# Patient Record
Sex: FEMALE | Race: WHITE | NOT HISPANIC OR LATINO | Employment: OTHER | URBAN - METROPOLITAN AREA
[De-identification: names, ages, dates, MRNs, and addresses within clinical notes are randomized per-mention and may not be internally consistent; named-entity substitution may affect disease eponyms.]

---

## 2021-04-08 DIAGNOSIS — Z23 ENCOUNTER FOR IMMUNIZATION: ICD-10-CM

## 2021-07-30 ENCOUNTER — VBI (OUTPATIENT)
Dept: ADMINISTRATIVE | Facility: OTHER | Age: 71
End: 2021-07-30

## 2021-07-30 NOTE — TELEPHONE ENCOUNTER
Upon review of the In Basket request we were able to locate, review, and update the patient chart as requested for CRC: Colonoscopy, DEXA Scan, Immunization(s) Inflenza, Mammogram and Pap Smear (HPV) aka Cervical Cancer Screening  Any additional questions or concerns should be emailed to the Practice Liaisons via [email or web address] email, please do not reply via In Basket      Thank you  Gaurav Begum

## 2021-11-05 ENCOUNTER — ANNUAL EXAM (OUTPATIENT)
Dept: OBGYN CLINIC | Facility: CLINIC | Age: 71
End: 2021-11-05
Payer: MEDICARE

## 2021-11-05 VITALS
HEIGHT: 63 IN | DIASTOLIC BLOOD PRESSURE: 80 MMHG | SYSTOLIC BLOOD PRESSURE: 120 MMHG | BODY MASS INDEX: 31.71 KG/M2 | WEIGHT: 179 LBS

## 2021-11-05 DIAGNOSIS — E28.39 ESTROGEN DEFICIENCY: ICD-10-CM

## 2021-11-05 DIAGNOSIS — Z13.820 SCREENING FOR OSTEOPOROSIS: ICD-10-CM

## 2021-11-05 DIAGNOSIS — Z01.419 ENCOUNTER FOR GYNECOLOGICAL EXAMINATION WITHOUT ABNORMAL FINDING: Primary | ICD-10-CM

## 2021-11-05 DIAGNOSIS — Z12.31 ENCOUNTER FOR SCREENING MAMMOGRAM FOR MALIGNANT NEOPLASM OF BREAST: ICD-10-CM

## 2021-11-05 PROCEDURE — G0101 CA SCREEN;PELVIC/BREAST EXAM: HCPCS | Performed by: OBSTETRICS & GYNECOLOGY

## 2022-12-02 ENCOUNTER — OFFICE VISIT (OUTPATIENT)
Dept: OBGYN CLINIC | Facility: CLINIC | Age: 72
End: 2022-12-02

## 2022-12-02 VITALS
BODY MASS INDEX: 33.05 KG/M2 | WEIGHT: 179.6 LBS | DIASTOLIC BLOOD PRESSURE: 72 MMHG | SYSTOLIC BLOOD PRESSURE: 132 MMHG | HEIGHT: 62 IN

## 2022-12-02 DIAGNOSIS — E28.39 ESTROGEN DEFICIENCY: ICD-10-CM

## 2022-12-02 DIAGNOSIS — Z12.31 ENCOUNTER FOR SCREENING MAMMOGRAM FOR MALIGNANT NEOPLASM OF BREAST: Primary | ICD-10-CM

## 2022-12-02 DIAGNOSIS — Z78.0 POSTMENOPAUSAL: ICD-10-CM

## 2022-12-02 NOTE — PROGRESS NOTES
[address], [address], [facility], [address]    ASSESSMENT/PLAN: Noris Echols is a 73 y o  [id number] who presents for annual gynecologic exam       - gyn  Fu  completed today  - Cervical Cancer Screening: Current ASCCP Guidelines reviewed  Last Pap: 2021   Next Pap Due: na  - COVID vaccine  [place]    Booster  2021   None   Since due to  Heart palpitations   + flu shot   - Breast Cancer Screening: Last Mammogram 2021,   - Colorectal cancer screening was not ordered  up todate    Per pt  2017 w  10 years   - The following were reviewed in today's visit: mammography screening ordered, menopause, osteoporosis, adequate intake of calcium and vitamin D, exercise, healthy diet, DEXA ordered and colonoscopy discussed     Additional problems addressed during this visit:  1  Encounter for screening mammogram for malignant neoplasm of breast  -     Mammo screening bilateral w 3d & cad; Future    2  Postmenopausal        CC:  Gyn  Fu     HPI: Noris Echols is a 73 y o  [id number] who presents for annual gynecologic examination  77 yo here   No bleeding, bloating or satiety  The following portions of the patient's history were reviewed and updated as appropriate: She  has a past medical history of Breast cancer screening (2021), H/O bone density study (2021), Lyme disease, Obesity due to excess calories, Papanicolaou smear (), and Varicella ()  She  has a past surgical history that includes  section; Tubal ligation (); Mammo (historical) (2021); DXA procedure(historical) (2021); and Colonoscopy (2017)  Her family history includes Brain cancer in her family; Diabetes in her mother; Heart attack in her mother; Osteoarthritis in her mother; Throat cancer in her father  She  reports that she has never smoked  She has never used smokeless tobacco  She reports current alcohol use   She reports that she does not use drugs  No current outpatient medications on file  No current facility-administered medications for this visit  She is allergic to sulfamethoxazole-trimethoprim       Review of Systems:  All systems normal except as noted in HPI          Objective:  /72 (BP Location: Left arm, Patient Position: Sitting, Cuff Size: Standard)   Ht 5' 1 5" (1 562 m)   Wt 81 5 kg (179 lb 9 6 oz)   BMI 33 39 kg/m²    Physical Exam  Vitals and nursing note reviewed  Constitutional:       Appearance: Normal appearance  HENT:      Head: Normocephalic  Cardiovascular:      Rate and Rhythm: Normal rate and regular rhythm  Pulses: Normal pulses  Heart sounds: Normal heart sounds  Pulmonary:      Effort: Pulmonary effort is normal       Breath sounds: Normal breath sounds  Chest:      Chest wall: No mass, lacerations, swelling, tenderness or edema  Breasts: Dragan Score is 4  Breasts are symmetrical       Right: Normal  No swelling, bleeding, inverted nipple, mass, nipple discharge, skin change or tenderness  Left: No swelling, bleeding, inverted nipple, mass, nipple discharge, skin change or tenderness  Abdominal:      General: Abdomen is flat  Bowel sounds are normal       Palpations: Abdomen is soft  Genitourinary:     General: Normal vulva  Exam position: Lithotomy position  Pubic Area: No rash  Dragan stage (genital): 4       Labia:         Right: No rash, tenderness or lesion  Left: No rash, tenderness or lesion  Urethra: No urethral pain, urethral swelling or urethral lesion  Vagina: Normal       Cervix: No cervical motion tenderness or discharge  Uterus: Normal        Adnexa: Right adnexa normal and left adnexa normal       Rectum: Normal    Musculoskeletal:         General: Normal range of motion  Cervical back: Neck supple  Lymphadenopathy:      Upper Body:      Right upper body: No supraclavicular, axillary or pectoral adenopathy  Left upper body: No supraclavicular, axillary or pectoral adenopathy  Lower Body: No right inguinal adenopathy  No left inguinal adenopathy  Skin:     General: Skin is warm and dry  Neurological:      General: No focal deficit present  Mental Status: She is alert and oriented to person, place, and time     Psychiatric:         Mood and Affect: Mood normal          Behavior: Behavior normal

## 2022-12-02 NOTE — PATIENT INSTRUCTIONS
Calcium 1200-1500mg + 800-1000 IU Vit D daily unless otherwise directed  Avoid falls  Exercise 150 minutes per week minimum including weight bearing exercises  DEXA scan-         No further paps after age 72 as long as there has been adequate normal paps completed, no history of NELSON 2  or a more severe pap diagnosis in the last 20 years  Annual mammogram and monthly breast self exam recommended   Colonoscopy-           Haley Tenorio 20 times twice daily  Silicone based lubricant with sex  Vaginal moisturizers twice weekly as needed

## 2023-11-03 ENCOUNTER — TELEPHONE (OUTPATIENT)
Dept: OBGYN CLINIC | Facility: HOSPITAL | Age: 73
End: 2023-11-03

## 2023-11-03 ENCOUNTER — TELEPHONE (OUTPATIENT)
Age: 73
End: 2023-11-03

## 2023-11-03 NOTE — TELEPHONE ENCOUNTER
Caller: Patient     Doctor/Office: Podiatry    Call regarding :  Tying to see if Xray results should be faxed or if she can bring in with her at time of appointment.      Call was transferred to: Podiatry

## 2023-11-03 NOTE — TELEPHONE ENCOUNTER
Hello,  Please advise if the following patient can be forced onto the schedule:    Patient: Tim Gonzalez    : 1950    MRN: [id number]    Call back #: [phone]    Insurance: Medicare    Reason for appointment: 2mm piece of glass/ceramic in foot/ [place]/nothing available soon. Requested doctor/location: Dr. Christianne Britt or Dr. Jozef Crews in [facility] office      Thank you.

## 2023-11-06 ENCOUNTER — APPOINTMENT (OUTPATIENT)
Dept: RADIOLOGY | Facility: CLINIC | Age: 73
End: 2023-11-06
Payer: MEDICARE

## 2023-11-06 ENCOUNTER — OFFICE VISIT (OUTPATIENT)
Dept: PODIATRY | Facility: CLINIC | Age: 73
End: 2023-11-06
Payer: MEDICARE

## 2023-11-06 VITALS
HEART RATE: 91 BPM | WEIGHT: 181 LBS | SYSTOLIC BLOOD PRESSURE: 165 MMHG | DIASTOLIC BLOOD PRESSURE: 82 MMHG | BODY MASS INDEX: 32.07 KG/M2 | HEIGHT: 63 IN

## 2023-11-06 DIAGNOSIS — M79.5 RESIDUAL FOREIGN BODY IN SOFT TISSUE: Primary | ICD-10-CM

## 2023-11-06 DIAGNOSIS — M79.672 LEFT FOOT PAIN: ICD-10-CM

## 2023-11-06 DIAGNOSIS — M79.5 RESIDUAL FOREIGN BODY IN SOFT TISSUE: ICD-10-CM

## 2023-11-06 PROCEDURE — 99203 OFFICE O/P NEW LOW 30 MIN: CPT | Performed by: PODIATRIST

## 2023-11-06 PROCEDURE — 73630 X-RAY EXAM OF FOOT: CPT

## 2023-11-06 NOTE — PROGRESS NOTES
PATIENT:  Stanley Hammer  1950       ASSESSMENT:     1. Residual foreign body in soft tissue  XR foot 3+ vw left      2. Left foot pain                  PLAN:  1. Reviewed medical records. Patient was counseled and educated on the condition and the diagnosis. 2. X-ray was obtained and personally reviewed. The radiological findings were discussed with the patient. 3. The diagnosis, treatment options and prognosis were discussed with the patient. 4. She has residual foreign body (Glass) in left heel. Her skin is closed and port of entry is not visible. Discussed options and she would proceed with surgical removal in the OR. 5. Explained surgical details and post-op course. Discussed all risks and complications related to the patient's condition and surgery. The benefits of surgery were also discussed. The patient understood that the surgery would not guarantee desirable outcome. All questions and concerns were addressed and the consent was signed. 6. Will plan OR. Imaging: I have personally reviewed pertinent films in PACS  Labs, pathology, and Other Studies: I have personally reviewed pertinent reports. Subjective:       HPI  The patient presents with chief complaint of left foot pain. She stepped on broken glasses 2 weeks ago. She got it out at home. Then, another piece came out a week ago. She went to ED and X-ray was stilling showing foreign body. Her skin is healed. No bleeding at this time. She has tenderness when she walks. No swelling or redness. No associated numbness or paresthesia. No significant weakness or dysfunction. The following portions of the patient's history were reviewed and updated as appropriate: allergies, current medications, past family history, past medical history, past social history, past surgical history and problem list.  All pertinent labs and images were reviewed.       Past Medical History  Past Medical History:   Diagnosis Date    Breast cancer screening 2021    birads  1 25-50% d    H/O bone density study 2021    normal    Lyme disease     Obesity due to excess calories     Papanicolaou smear     Varicella        Past Surgical History  Past Surgical History:   Procedure Laterality Date     SECTION      x3  , , 1985     COLONOSCOPY  2017    [facility] [address] [place] [address]     DXA PROCEDURE (HISTORICAL)  2021    [address] [facility] (HISTORICAL)  2021    [facility]        Allergies:  Sulfamethoxazole-trimethoprim    Medications:  No current outpatient medications on file. No current facility-administered medications for this visit. Social History:  Social History     Socioeconomic History    Marital status: /Civil Union     Spouse name: None    Number of children: None    Years of education: None    Highest education level: None   Occupational History    Occupation: Retired    Tobacco Use    Smoking status: Never    Smokeless tobacco: Never   Vaping Use    Vaping Use: Never used   Substance and Sexual Activity    Alcohol use: Yes     Comment: Special occasions (1 or less/month)     Drug use: Never     Comment: No - As per [employer]     Sexual activity: Yes     Partners: Male     Birth control/protection: Post-menopausal     Comment: No new partner in last year    Other Topics Concern    None   Social History Narrative    Exercise: 2-3 times a week    Domestic violence: No     Social Determinants of Health     Financial Resource Strain: Not on file   Food Insecurity: Not on file   Transportation Needs: Not on file   Physical Activity: Not on file   Stress: Not on file   Social Connections: Not on file   Intimate Partner Violence: Not on file   Housing Stability: Not on file          Review of Systems   Constitutional:  Negative for chills and fever.    Respiratory:  Negative for cough and shortness of breath. Cardiovascular:  Negative for chest pain. Gastrointestinal:  Negative for nausea and vomiting. Musculoskeletal:  Negative for gait problem. Allergic/Immunologic: Negative for immunocompromised state. Neurological:  Negative for weakness and numbness. Hematological: Negative. Psychiatric/Behavioral:  Negative for behavioral problems and confusion. Objective:      /82   Pulse 91   Ht 5' 3" (1.6 m)   Wt 82.1 kg (181 lb)   BMI 32.06 kg/m²          Physical Exam  Vitals reviewed. Constitutional:       General: She is not in acute distress. Appearance: She is not toxic-appearing or diaphoretic. HENT:      Head: Normocephalic and atraumatic. Eyes:      Extraocular Movements: Extraocular movements intact. Cardiovascular:      Rate and Rhythm: Normal rate and regular rhythm. Pulses: Normal pulses. Dorsalis pedis pulses are 2+ on the right side and 2+ on the left side. Posterior tibial pulses are 2+ on the right side and 2+ on the left side. Pulmonary:      Effort: Pulmonary effort is normal. No respiratory distress. Musculoskeletal:         General: No swelling or signs of injury. Cervical back: Normal range of motion and neck supple. Right lower leg: No edema. Left lower leg: No edema. Right foot: No foot drop. Left foot: No foot drop. Skin:     General: Skin is warm. Capillary Refill: Capillary refill takes less than 2 seconds. Coloration: Skin is not cyanotic or mottled. Findings: No abscess. Nails: There is no clubbing. Comments: No port of entry left heel. Generalized tenderness left plantar medial heel. No signs of infection. No drainage or bleeding. Neurological:      General: No focal deficit present. Mental Status: She is alert and oriented to person, place, and time. Cranial Nerves: No cranial nerve deficit. Sensory: No sensory deficit.       Motor: No weakness. Coordination: Coordination normal.   Psychiatric:         Mood and Affect: Mood normal.         Behavior: Behavior normal.         Thought Content:  Thought content normal.         Judgment: Judgment normal.

## 2023-11-07 ENCOUNTER — TELEPHONE (OUTPATIENT)
Dept: OBGYN CLINIC | Facility: CLINIC | Age: 73
End: 2023-11-07

## 2023-11-07 NOTE — TELEPHONE ENCOUNTER
Called and left message for patient regarding scheduling foot surgery (per Dr. Deepika Blanco). Mentioned that 11/17/23 is available.   Provided my direct ph [phone] for call back

## 2023-11-08 ENCOUNTER — TELEPHONE (OUTPATIENT)
Dept: OBGYN CLINIC | Facility: CLINIC | Age: 73
End: 2023-11-08

## 2023-11-08 ENCOUNTER — PREP FOR PROCEDURE (OUTPATIENT)
Dept: OBGYN CLINIC | Facility: CLINIC | Age: 73
End: 2023-11-08

## 2023-11-08 DIAGNOSIS — S91.342A PUNCTURE WOUND WITH FOREIGN BODY, LEFT FOOT, INITIAL ENCOUNTER: ICD-10-CM

## 2023-11-08 DIAGNOSIS — Z01.818 PRE-OPERATIVE CLEARANCE: Primary | ICD-10-CM

## 2023-11-08 RX ORDER — CHLORHEXIDINE GLUCONATE 4 G/100ML
SOLUTION TOPICAL DAILY PRN
Status: CANCELLED | OUTPATIENT
Start: 2023-11-17

## 2023-11-08 RX ORDER — CEFAZOLIN SODIUM 2 G/50ML
2000 SOLUTION INTRAVENOUS ONCE
Status: CANCELLED | OUTPATIENT
Start: 2023-11-17

## 2023-11-08 RX ORDER — CHLORHEXIDINE GLUCONATE ORAL RINSE 1.2 MG/ML
15 SOLUTION DENTAL ONCE
Status: CANCELLED | OUTPATIENT
Start: 2023-11-17

## 2023-11-08 NOTE — TELEPHONE ENCOUNTER
Spoke with patient. Scheduled foot surgery for 11/17/23, and all related appointments. All questions answered.

## 2023-11-09 ENCOUNTER — APPOINTMENT (OUTPATIENT)
Dept: LAB | Facility: HOSPITAL | Age: 73
End: 2023-11-09
Payer: MEDICARE

## 2023-11-09 ENCOUNTER — OFFICE VISIT (OUTPATIENT)
Dept: LAB | Facility: HOSPITAL | Age: 73
End: 2023-11-09
Payer: MEDICARE

## 2023-11-09 DIAGNOSIS — Z01.818 PRE-OPERATIVE CLEARANCE: ICD-10-CM

## 2023-11-09 LAB
ANION GAP SERPL CALCULATED.3IONS-SCNC: 11 MMOL/L
ATRIAL RATE: 74 BPM
BASOPHILS # BLD AUTO: 0.01 THOUSANDS/ÂΜL (ref 0–0.1)
BASOPHILS NFR BLD AUTO: 0 % (ref 0–1)
BUN SERPL-MCNC: 15 MG/DL (ref 5–25)
CALCIUM SERPL-MCNC: 9.3 MG/DL (ref 8.4–10.2)
CHLORIDE SERPL-SCNC: 103 MMOL/L (ref 96–108)
CO2 SERPL-SCNC: 26 MMOL/L (ref 21–32)
CREAT SERPL-MCNC: 0.82 MG/DL (ref 0.6–1.3)
EOSINOPHIL # BLD AUTO: 0.1 THOUSAND/ÂΜL (ref 0–0.61)
EOSINOPHIL NFR BLD AUTO: 2 % (ref 0–6)
ERYTHROCYTE [DISTWIDTH] IN BLOOD BY AUTOMATED COUNT: 12.7 % (ref 11.6–15.1)
GFR SERPL CREATININE-BSD FRML MDRD: 72 ML/MIN/1.73SQ M
GLUCOSE P FAST SERPL-MCNC: 111 MG/DL (ref 65–99)
HCT VFR BLD AUTO: 43.4 % (ref 34.8–46.1)
HGB BLD-MCNC: 15 G/DL (ref 11.5–15.4)
IMM GRANULOCYTES # BLD AUTO: 0.02 THOUSAND/UL (ref 0–0.2)
IMM GRANULOCYTES NFR BLD AUTO: 0 % (ref 0–2)
LYMPHOCYTES # BLD AUTO: 1.98 THOUSANDS/ÂΜL (ref 0.6–4.47)
LYMPHOCYTES NFR BLD AUTO: 36 % (ref 14–44)
MCH RBC QN AUTO: 31.9 PG (ref 26.8–34.3)
MCHC RBC AUTO-ENTMCNC: 34.6 G/DL (ref 31.4–37.4)
MCV RBC AUTO: 92 FL (ref 82–98)
MONOCYTES # BLD AUTO: 0.46 THOUSAND/ÂΜL (ref 0.17–1.22)
MONOCYTES NFR BLD AUTO: 8 % (ref 4–12)
NEUTROPHILS # BLD AUTO: 2.96 THOUSANDS/ÂΜL (ref 1.85–7.62)
NEUTS SEG NFR BLD AUTO: 54 % (ref 43–75)
NRBC BLD AUTO-RTO: 0 /100 WBCS
P AXIS: 37 DEGREES
PLATELET # BLD AUTO: 273 THOUSANDS/UL (ref 149–390)
PMV BLD AUTO: 10.1 FL (ref 8.9–12.7)
POTASSIUM SERPL-SCNC: 3.9 MMOL/L (ref 3.5–5.3)
PR INTERVAL: 190 MS
QRS AXIS: 3 DEGREES
QRSD INTERVAL: 72 MS
QT INTERVAL: 372 MS
QTC INTERVAL: 412 MS
RBC # BLD AUTO: 4.7 MILLION/UL (ref 3.81–5.12)
SODIUM SERPL-SCNC: 140 MMOL/L (ref 135–147)
T WAVE AXIS: 31 DEGREES
VENTRICULAR RATE: 74 BPM
WBC # BLD AUTO: 5.53 THOUSAND/UL (ref 4.31–10.16)

## 2023-11-09 PROCEDURE — 36415 COLL VENOUS BLD VENIPUNCTURE: CPT

## 2023-11-09 PROCEDURE — 93005 ELECTROCARDIOGRAM TRACING: CPT

## 2023-11-09 PROCEDURE — 93010 ELECTROCARDIOGRAM REPORT: CPT | Performed by: INTERNAL MEDICINE

## 2023-11-09 PROCEDURE — 85025 COMPLETE CBC W/AUTO DIFF WBC: CPT

## 2023-11-09 PROCEDURE — 80048 BASIC METABOLIC PNL TOTAL CA: CPT

## 2023-11-14 NOTE — PRE-PROCEDURE INSTRUCTIONS
No outpatient medications have been marked as taking for [facility]). Pt not taking any medications    Medication instructions for day surgery reviewed. Please use only a sip of water to take your instructed medications. Avoid all over the counter vitamins, supplements and NSAIDS for one week prior to surgery per anesthesia guidelines. Tylenol is ok to take as needed. You will receive a call one business day prior to surgery with an arrival time and hospital directions. If your surgery is scheduled on a Monday, the hospital will be calling you on the Friday prior to your surgery. If you have not heard from anyone by 8pm, please call the hospital supervisor through the hospital  at [phone]. Nan Ann [phone]). Do not eat or drink anything after midnight the night before your surgery, including candy, mints, lifesavers, or chewing gum. Do not drink alcohol 24hrs before your surgery. Try not to smoke at least 24hrs before your surgery. Follow the pre surgery showering instructions as listed in the [facility] Surgical Experience Booklet” or otherwise provided by your surgeon's office. Do not use a blade to shave the surgical area 1 week before surgery. It is okay to use a clean electric clippers up to 24 hours before surgery. Do not apply any lotions, creams, including makeup, cologne, deodorant, or perfumes after showering on the day of your surgery. Do not use dry shampoo, hair spray, hair gel, or any type of hair products. No contact lenses, eye make-up, or artificial eyelashes. Remove nail polish, including gel polish, and any artificial, gel, or acrylic nails if possible. Remove all jewelry including rings and body piercing jewelry. Wear causal clothing that is easy to take on and off. Consider your type of surgery. Keep any valuables, jewelry, piercings at home. Please bring any specially ordered equipment (sling, braces) if indicated.     Arrange for a responsible person to drive you to and from the hospital on the day of your surgery. Visitor Guidelines discussed. Call the surgeon's office with any new illnesses, exposures, or additional questions prior to surgery. Please reference your [facility] Surgical Experience Booklet” for additional information to prepare for your upcoming surgery.

## 2023-11-16 ENCOUNTER — ANESTHESIA EVENT (OUTPATIENT)
Dept: PERIOP | Facility: HOSPITAL | Age: 73
End: 2023-11-16
Payer: MEDICARE

## 2023-11-17 ENCOUNTER — ANESTHESIA (OUTPATIENT)
Dept: PERIOP | Facility: HOSPITAL | Age: 73
End: 2023-11-17
Payer: MEDICARE

## 2023-11-17 ENCOUNTER — APPOINTMENT (OUTPATIENT)
Dept: RADIOLOGY | Facility: HOSPITAL | Age: 73
End: 2023-11-17
Payer: MEDICARE

## 2023-11-17 ENCOUNTER — HOSPITAL ENCOUNTER (OUTPATIENT)
Facility: HOSPITAL | Age: 73
Setting detail: OUTPATIENT SURGERY
Discharge: HOME | End: 2023-11-17
Attending: PODIATRIST | Admitting: PODIATRIST
Payer: MEDICARE

## 2023-11-17 VITALS
HEART RATE: 66 BPM | DIASTOLIC BLOOD PRESSURE: 80 MMHG | RESPIRATION RATE: 18 BRPM | TEMPERATURE: 97.8 F | SYSTOLIC BLOOD PRESSURE: 170 MMHG | OXYGEN SATURATION: 97 %

## 2023-11-17 DIAGNOSIS — Z98.890 POST-OPERATIVE STATE: Primary | ICD-10-CM

## 2023-11-17 PROCEDURE — 73650 X-RAY EXAM OF HEEL: CPT

## 2023-11-17 PROCEDURE — 73620 X-RAY EXAM OF FOOT: CPT

## 2023-11-17 PROCEDURE — 99024 POSTOP FOLLOW-UP VISIT: CPT | Performed by: PODIATRIST

## 2023-11-17 PROCEDURE — 28192 REMOVAL OF FOOT FOREIGN BODY: CPT | Performed by: PODIATRIST

## 2023-11-17 RX ORDER — SODIUM CHLORIDE, SODIUM LACTATE, POTASSIUM CHLORIDE, CALCIUM CHLORIDE 600; 310; 30; 20 MG/100ML; MG/100ML; MG/100ML; MG/100ML
75 INJECTION, SOLUTION INTRAVENOUS CONTINUOUS
Status: DISCONTINUED | OUTPATIENT
Start: 2023-11-17 | End: 2023-11-17 | Stop reason: HOSPADM

## 2023-11-17 RX ORDER — CEFAZOLIN SODIUM 2 G/50ML
2000 SOLUTION INTRAVENOUS ONCE
Status: COMPLETED | OUTPATIENT
Start: 2023-11-17 | End: 2023-11-17

## 2023-11-17 RX ORDER — CHLORHEXIDINE GLUCONATE 4 G/100ML
SOLUTION TOPICAL DAILY PRN
Status: DISCONTINUED | OUTPATIENT
Start: 2023-11-17 | End: 2023-11-17 | Stop reason: HOSPADM

## 2023-11-17 RX ORDER — ACETAMINOPHEN 325 MG/1
650 TABLET ORAL EVERY 4 HOURS PRN
Status: DISCONTINUED | OUTPATIENT
Start: 2023-11-17 | End: 2023-11-17 | Stop reason: HOSPADM

## 2023-11-17 RX ORDER — FENTANYL CITRATE/PF 50 MCG/ML
25 SYRINGE (ML) INJECTION
Status: DISCONTINUED | OUTPATIENT
Start: 2023-11-17 | End: 2023-11-17 | Stop reason: HOSPADM

## 2023-11-17 RX ORDER — OXYCODONE HYDROCHLORIDE AND ACETAMINOPHEN 5; 325 MG/1; MG/1
1 TABLET ORAL EVERY 4 HOURS PRN
Status: DISCONTINUED | OUTPATIENT
Start: 2023-11-17 | End: 2023-11-17 | Stop reason: HOSPADM

## 2023-11-17 RX ORDER — PROPOFOL 10 MG/ML
INJECTION, EMULSION INTRAVENOUS AS NEEDED
Status: DISCONTINUED | OUTPATIENT
Start: 2023-11-17 | End: 2023-11-17

## 2023-11-17 RX ORDER — CHLORHEXIDINE GLUCONATE ORAL RINSE 1.2 MG/ML
15 SOLUTION DENTAL ONCE
Status: COMPLETED | OUTPATIENT
Start: 2023-11-17 | End: 2023-11-17

## 2023-11-17 RX ORDER — LIDOCAINE HYDROCHLORIDE 10 MG/ML
INJECTION, SOLUTION EPIDURAL; INFILTRATION; INTRACAUDAL; PERINEURAL AS NEEDED
Status: DISCONTINUED | OUTPATIENT
Start: 2023-11-17 | End: 2023-11-17

## 2023-11-17 RX ORDER — FENTANYL CITRATE 50 UG/ML
INJECTION, SOLUTION INTRAMUSCULAR; INTRAVENOUS AS NEEDED
Status: DISCONTINUED | OUTPATIENT
Start: 2023-11-17 | End: 2023-11-17

## 2023-11-17 RX ORDER — OXYCODONE HYDROCHLORIDE AND ACETAMINOPHEN 5; 325 MG/1; MG/1
1 TABLET ORAL EVERY 4 HOURS PRN
Qty: 10 TABLET | Refills: 0 | Status: SHIPPED | OUTPATIENT
Start: 2023-11-17 | End: 2023-11-27

## 2023-11-17 RX ORDER — PROPOFOL 10 MG/ML
INJECTION, EMULSION INTRAVENOUS CONTINUOUS PRN
Status: DISCONTINUED | OUTPATIENT
Start: 2023-11-17 | End: 2023-11-17

## 2023-11-17 RX ORDER — MAGNESIUM HYDROXIDE 1200 MG/15ML
LIQUID ORAL AS NEEDED
Status: DISCONTINUED | OUTPATIENT
Start: 2023-11-17 | End: 2023-11-17 | Stop reason: HOSPADM

## 2023-11-17 RX ORDER — LIDOCAINE HYDROCHLORIDE 10 MG/ML
INJECTION, SOLUTION EPIDURAL; INFILTRATION; INTRACAUDAL; PERINEURAL AS NEEDED
Status: DISCONTINUED | OUTPATIENT
Start: 2023-11-17 | End: 2023-11-17 | Stop reason: HOSPADM

## 2023-11-17 RX ORDER — BUPIVACAINE HYDROCHLORIDE 5 MG/ML
INJECTION, SOLUTION EPIDURAL; INTRACAUDAL AS NEEDED
Status: DISCONTINUED | OUTPATIENT
Start: 2023-11-17 | End: 2023-11-17 | Stop reason: HOSPADM

## 2023-11-17 RX ADMIN — PROPOFOL 50 MG: 10 INJECTION, EMULSION INTRAVENOUS at 08:46

## 2023-11-17 RX ADMIN — PROPOFOL 100 MCG/KG/MIN: 10 INJECTION, EMULSION INTRAVENOUS at 08:48

## 2023-11-17 RX ADMIN — PROPOFOL 50 MG: 10 INJECTION, EMULSION INTRAVENOUS at 08:50

## 2023-11-17 RX ADMIN — FENTANYL CITRATE 50 MCG: 50 INJECTION, SOLUTION INTRAMUSCULAR; INTRAVENOUS at 08:46

## 2023-11-17 RX ADMIN — CEFAZOLIN SODIUM 2000 MG: 2 SOLUTION INTRAVENOUS at 08:51

## 2023-11-17 RX ADMIN — PROPOFOL 50 MG: 10 INJECTION, EMULSION INTRAVENOUS at 09:08

## 2023-11-17 RX ADMIN — LIDOCAINE HYDROCHLORIDE 50 MG: 10 INJECTION, SOLUTION EPIDURAL; INFILTRATION; INTRACAUDAL at 08:46

## 2023-11-17 RX ADMIN — CHLORHEXIDINE GLUCONATE 0.12% ORAL RINSE 15 ML: 1.2 LIQUID ORAL at 07:22

## 2023-11-17 RX ADMIN — SODIUM CHLORIDE, SODIUM LACTATE, POTASSIUM CHLORIDE, AND CALCIUM CHLORIDE: .6; .31; .03; .02 INJECTION, SOLUTION INTRAVENOUS at 08:30

## 2023-11-17 RX ADMIN — FENTANYL CITRATE 50 MCG: 50 INJECTION, SOLUTION INTRAMUSCULAR; INTRAVENOUS at 08:50

## 2023-11-17 NOTE — NURSING NOTE
Awake and tolerating po well.  Surgical shoe dispensed and discharge instructions reviewed with patient and  who is here to drive home/

## 2023-11-17 NOTE — OP NOTE
OPERATIVE REPORT - Podiatry  PATIENT NAME: Chris Ridley    :  1950  MRN: [id number]  Pt Location: [address] [place] [address] ROOM 12    SURGERY DATE: 2023    Surgeon(s) and Role:     * Jay Lowe, DPM - Primary     * PRABHU MendezM - Assisting    Pre-op Diagnosis:  Puncture wound with foreign body, left foot, initial encounter S91.342A    Post-Op Diagnosis Codes:     * Puncture wound with foreign body, left foot, initial encounter [S91.342A]    Procedure(s) (LRB):  REMOVAL FOREIGN BODY LEFT FOOT (Left)    Specimen(s):  * No specimens in log *    Estimated Blood Loss:   Minimal    Drains:  * No LDAs found *    Anesthesia Type:   Choice with 10 ml of 1% Lidocaine and 0.5% Bupivacaine in a 1:1 mixture    Hemostasis:  Pneumatic ankle tourniquet set at 250 mmHg for 47 mins  Direct compression, electrocautery    Materials:  3-0 nylon    Injectables:  None    Operative Findings:  No obvious foreign body identified, no foreign body seen on intra-op fluoroscopy     Complications:   None    Procedure and Technique:     Under mild sedation, the patient was brought into the operating room and placed on the operating room table in the supine position. IV sedation was achieved by anesthesia team and a universal timeout was performed where all parties are in agreement of correct patient, correct procedure and correct site. A pneumatic tourniquet was then placed over the patient's left lower extremity with ample padding. A V block was performed consisting of 10 ml of local anesthetic. The foot was then prepped and draped in the usual aseptic manner. An esmarch bandage was used to exsangunate the foot and the pneumatic tourniquet was then inflated to 250 mmHg. Attention was directed to medial plantar heel. A 2cm incision was made at calloused skin area at site of suspected entrance of foreign body. Position confirmed under c-arm. No radiopaque foreign body visualized under C arm. Dissection through subcutaneous layer was made. An 18-gauge needle was used to probe her foreign body. No obvious foreign body seen. The surgical incision was irrigated with copious amounts of normal sterile saline. Skin edges were reapproximated and closure was obtained utilizing 3-0 nylon. The foot was then cleansed and dried. A postoperative injection consisting of 8 ml of 0.5% Bupivacaine was performed. The incision site was dressed with betadine soaked adaptic, gauze. This was then covered with a Kerlix and an ACE wrap. The tourniquet was deflated at approximately 47 min and normal hyperemic response was noted to all digits. The patient tolerated the procedure and anesthesia well without immediate complications and transferred to PACU with vital signs stable. Dr. Ria Lawrence was present during the entire procedure and participated in all key aspects. SIGNATURE: Mary Stokes DPM  DATE: November 17, 2023  TIME: 10:03 AM      Portions of the record may have been created with voice recognition software. Occasional wrong word or "sound a like" substitutions may have occurred due to the inherent limitations of voice recognition software. Read the chart carefully and recognize, using context, where substitutions have occurred.

## 2023-11-17 NOTE — DISCHARGE INSTR - AVS FIRST PAGE
Dr. Dandy Shields Instructions    1. Take your prescribed medication as directed. 2. Upon arrival at home, lie down and elevate your surgical foot on 2 pillows. 3. Stay off your feet as much as possible for the first 24-48 hours. This is when your feet first swell and may become painful. After 48 hours you may begin limited walking following these restrictions: Toe touch weightbearing to surgical foot, surgical shoe as needed     4. Drink large quantities of water. Consume no alcohol. Continue a well-balanced diet. 5. Report any unusual discomfort or fever to this office. 6. A limited amount of discomfort and swelling is to be expected. In some cases the skin may take on a bruised appearance. The surgical cleansing solution that was applied to your foot prior to the operation is dark in color and the operation site may appear to be oozing when it actually is not. 7. A slight amount of blood is to be expected, and is no cause for alarm. Do not remove the dressings. If there is active bleeding and if the bleeding persists, add additional gauze to the bandage, apply direct pressure, elevate your feet and call this office. 8. Do not get the dressings wet. As regular bathing may be inconvenient, sponge baths are recommended. 9. When anesthesia wears off and if any discomfort should be present, apply an ice pack directly over the operated area for 15 minute intervals for several hours or until the pain leaves. (USE IN EXCESS OF 15 MINUTES COULD CAUSE FROSTBITE). Do not use hot water bags or electric pads. A convenient icepack can be made by placing ice cubes in a plastic bag and covering this with a towel. 10. Take over-the-counter laxative for constipation, this is common with use of narcotic medications.

## 2023-11-17 NOTE — ANESTHESIA PROCEDURE NOTES
Anesthesia Notable Event    Date/Time: 11/17/2023 10:47 AM    Performed by: Francy Farris MD  Authorized by: Francy Farris MD

## 2023-11-17 NOTE — INTERVAL H&P NOTE
H&P reviewed. After examining the patient I find no changes in the patients condition since the H&P had been written.     Vitals:    11/17/23 0717   BP: 153/68   Pulse: 86   Resp: 16   Temp: 97.9 °F (36.6 °C)   SpO2: 95%

## 2023-11-17 NOTE — ANESTHESIA PREPROCEDURE EVALUATION
Procedure:  REMOVAL FOREIGN BODY LEFT FOOT (Left: Foot)    Relevant Problems   Other   (+) BMI 33.0-33.9,adult   (+) Postmenopausal        Physical Exam    Airway    Mallampati score: II  TM Distance: >3 FB  Neck ROM: full     Dental   No notable dental hx     Cardiovascular      Pulmonary      Other Findings  post-pubertal.      Anesthesia Plan  ASA Score- 2     Anesthesia Type- IV sedation with anesthesia with ASA Monitors. Additional Monitors:     Airway Plan:            Plan Factors-Exercise tolerance (METS): >4 METS. Chart reviewed. Patient summary reviewed. Patient is not a current smoker. Patient did not smoke on day of surgery. Induction- intravenous. Postoperative Plan- Plan for postoperative opioid use. Informed Consent- Anesthetic plan and risks discussed with patient. I personally reviewed this patient with the CRNA. Discussed and agreed on the Anesthesia Plan with the CRNA. Kenia Alonso

## 2023-11-17 NOTE — ANESTHESIA POSTPROCEDURE EVALUATION
Post-Op Assessment Note    CV Status:  Stable  Pain Score: 0    Pain management: adequate       Mental Status:  Alert and awake   Hydration Status:  Euvolemic   PONV Controlled:  Controlled   Airway Patency:  Patent     Post Op Vitals Reviewed: Yes    No anethesia notable event occurred.     Staff: ALEKSANDR           /72 (11/17/23 0954)    Temp (!) 97.2 °F (36.2 °C) (11/17/23 0954)    Pulse 73 (11/17/23 0954)   Resp 20 (11/17/23 0954)    SpO2 91 % (11/17/23 0954)

## 2023-11-17 NOTE — DISCHARGE SUMMARY
Discharge Summary Outpatient Procedure Podiatry -   Phu Dire 81 y.o. female MRN: [id number]  Unit/Bed#: OR POOL Encounter: [id number]    Admission Date: 11/17/2023     Admitting Diagnosis: Puncture wound with foreign body, left foot, initial encounter Mayra Styles    Discharge Diagnosis: same    Procedures Performed: REMOVAL FOREIGN BODY LEFT FOOT: 48053 (CPT®)    Complications: none    Condition at Discharge: stable    Discharge instructions/Information to patient and family:   See after visit summary for information provided to patient and family. Provisions for Follow-Up Care/Important appointments:  See after visit summary for information related to follow-up care and any pertinent home health orders. Discharge Medications:  See after visit summary for reconciled discharge medications provided to patient and family.

## 2023-11-27 ENCOUNTER — OFFICE VISIT (OUTPATIENT)
Dept: PODIATRY | Facility: CLINIC | Age: 73
End: 2023-11-27

## 2023-11-27 VITALS
BODY MASS INDEX: 32.06 KG/M2 | HEIGHT: 63 IN | DIASTOLIC BLOOD PRESSURE: 82 MMHG | SYSTOLIC BLOOD PRESSURE: 154 MMHG | HEART RATE: 81 BPM

## 2023-11-27 DIAGNOSIS — M79.5 RESIDUAL FOREIGN BODY IN SOFT TISSUE: Primary | ICD-10-CM

## 2023-11-27 PROCEDURE — 99024 POSTOP FOLLOW-UP VISIT: CPT | Performed by: PODIATRIST

## 2023-11-27 NOTE — PROGRESS NOTES
PATIENT:  González Ma      1950    ASSESSMENT     1. Residual foreign body in soft tissue               PLAN  Patient is doing well post-operatively. Sutures left intact. Incision was cleaned with betadine and DSD applied to be kept C/D/I. Continue post-op care as instructed. Stressed on patient compliance about proper off-loading, staying off of feet, and proper dressing care. Call if any increase in pain, fevers, calf pain, shortness of breath, or general distress is noted. Patient instructed to go to ER if call is not returned immediately. RA in 1 week. HISTORY OF PRESENT ILLNESS  Patient presents for post-op appointment. Post-op pain is under control and resolving well. The patient is feeling well and in good spirits. Patient reported no post-op concern. REVIEW OF SYSTEMS  GENERAL: No fever or chills. HEART: No chest pain, or palpitation  RESPIRATORY:  No SOB or cough  GI: No Nausea, vomit or diarrhea  NEUROLOGIC: No syncope or acute weakness  MUSCULOSKELETAL: No calf pain or edema. PHYSICAL EXAMINATION    /82 Comment: unabel to take  Pulse 81   Ht 5' 3" (1.6 m)   BMI 32.06 kg/m²     GENERAL  The patient appears in NAD / non-toxic. Afebrile. VSS    VASCULAR EXAM  Pedal pulses and vascular status are intact. No calf pain or edema bilaterally. No cyanosis. DERMATOLOGIC EXAM  Incision is coapted and healing well. No signs of infection. No active drainage. Minimal post-op edema and ecchymosis. No necrosis or dehiscence. NEUROLOGIC EXAM  AAO X 3. No focal neurologic deficit. Neurologic status is intact BLE. MUSCULOSKELETAL EXAM  Normal post-op findings. ROM intact. No fluctuation or crepitus.

## 2023-12-04 ENCOUNTER — OFFICE VISIT (OUTPATIENT)
Dept: PODIATRY | Facility: CLINIC | Age: 73
End: 2023-12-04

## 2023-12-04 VITALS
SYSTOLIC BLOOD PRESSURE: 154 MMHG | DIASTOLIC BLOOD PRESSURE: 75 MMHG | BODY MASS INDEX: 32.07 KG/M2 | WEIGHT: 181 LBS | HEIGHT: 63 IN | HEART RATE: 87 BPM

## 2023-12-04 DIAGNOSIS — M79.5 RESIDUAL FOREIGN BODY IN SOFT TISSUE: Primary | ICD-10-CM

## 2023-12-04 PROCEDURE — 99024 POSTOP FOLLOW-UP VISIT: CPT | Performed by: PODIATRIST

## 2023-12-04 NOTE — PROGRESS NOTES
PATIENT:  Stanley Hammer      1950    ASSESSMENT     1. Residual foreign body in soft tissue               PLAN  Patient is doing well post-operatively. Sutures removed. Instructed skin care and protection. Slowly advance shoes as tolerated. Continue post-op care as instructed. Stressed on patient compliance about proper off-loading, staying off of feet, and proper dressing care. Call if any increase in pain, fevers, calf pain, shortness of breath, or general distress is noted. Patient instructed to go to ER if call is not returned immediately. RA in 3 weeks. HISTORY OF PRESENT ILLNESS  Patient presents for post-op appointment. Post-op pain is minimal.  The patient is feeling well and in good spirits. REVIEW OF SYSTEMS  GENERAL: No fever or chills. HEART: No chest pain, or palpitation  RESPIRATORY:  No SOB or cough  GI: No Nausea, vomit or diarrhea  NEUROLOGIC: No syncope or acute weakness  MUSCULOSKELETAL: No calf pain or edema. PHYSICAL EXAMINATION    /75   Pulse 87   Ht 5' 3" (1.6 m)   Wt 82.1 kg (181 lb)   BMI 32.06 kg/m²     GENERAL  The patient appears in NAD / non-toxic. Afebrile. VSS    VASCULAR EXAM  Pedal pulses and vascular status are intact. No calf pain or edema bilaterally. No cyanosis. DERMATOLOGIC EXAM  Incision is coapted and healed. No signs of infection. No drainage. Minimal post-op edema and ecchymosis. No necrosis or dehiscence. NEUROLOGIC EXAM  AAO X 3. No focal neurologic deficit. Neurologic status is intact BLE. MUSCULOSKELETAL EXAM  Normal post-op findings. ROM intact. No fluctuation or crepitus.

## 2023-12-27 ENCOUNTER — OFFICE VISIT (OUTPATIENT)
Dept: PODIATRY | Facility: CLINIC | Age: 73
End: 2023-12-27

## 2023-12-27 VITALS
SYSTOLIC BLOOD PRESSURE: 142 MMHG | WEIGHT: 181 LBS | HEIGHT: 63 IN | DIASTOLIC BLOOD PRESSURE: 84 MMHG | HEART RATE: 91 BPM | BODY MASS INDEX: 32.07 KG/M2

## 2023-12-27 DIAGNOSIS — M79.5 RESIDUAL FOREIGN BODY IN SOFT TISSUE: Primary | ICD-10-CM

## 2023-12-27 PROCEDURE — 99024 POSTOP FOLLOW-UP VISIT: CPT | Performed by: PODIATRIST

## 2023-12-27 NOTE — PROGRESS NOTES
"        PATIENT:  Julia Valdez      1950    ASSESSMENT     1. Residual foreign body in soft tissue               PLAN  Patient is doing well post-operatively.  Instructed skin care.  Protective dressing until sensitivity resolves.  Slowly advance activity as tolerated.  D/C her from our care.      HISTORY OF PRESENT ILLNESS  Patient presents for post-op appointment.  Pain is minimal.  Some sensitivity / burning sensation around the incision after being on her feet for a while.  She was on her feet quite a bit during the Holiday weekend.      REVIEW OF SYSTEMS  GENERAL: No fever or chills.    HEART: No chest pain, or palpitation  RESPIRATORY:  No SOB or cough  GI: No Nausea, vomit or diarrhea  NEUROLOGIC: No syncope or acute weakness      PHYSICAL EXAMINATION    /84   Pulse 91   Ht 5' 3\" (1.6 m)   Wt 82.1 kg (181 lb)   BMI 32.06 kg/m²     GENERAL  The patient appears in NAD / non-toxic. Afebrile. VSS    VASCULAR EXAM  Pedal pulses and vascular status are intact.  No calf pain / edema bilaterally.  No cyanosis.    DERMATOLOGIC EXAM  Incision remains healed.  No signs of infection. No drainage.  No necrosis or dehiscence.    NEUROLOGIC EXAM  AAO X 3.  No focal neurologic deficit.  Neurologic status is intact BLE.    MUSCULOSKELETAL EXAM  Normal post-op findings. ROM intact.  No fluctuation or crepitus.  "

## 2024-01-08 ENCOUNTER — ANNUAL EXAM (OUTPATIENT)
Dept: OBGYN CLINIC | Facility: CLINIC | Age: 74
End: 2024-01-08
Payer: MEDICARE

## 2024-01-08 VITALS
BODY MASS INDEX: 32.07 KG/M2 | WEIGHT: 181 LBS | SYSTOLIC BLOOD PRESSURE: 130 MMHG | HEIGHT: 63 IN | DIASTOLIC BLOOD PRESSURE: 74 MMHG

## 2024-01-08 DIAGNOSIS — Z78.0 POSTMENOPAUSAL: ICD-10-CM

## 2024-01-08 DIAGNOSIS — E28.39 ESTROGEN DEFICIENCY: ICD-10-CM

## 2024-01-08 DIAGNOSIS — Z12.31 ENCOUNTER FOR SCREENING MAMMOGRAM FOR MALIGNANT NEOPLASM OF BREAST: ICD-10-CM

## 2024-01-08 DIAGNOSIS — Z13.820 SCREENING FOR OSTEOPOROSIS: ICD-10-CM

## 2024-01-08 DIAGNOSIS — Z01.419 ENCOUNTER FOR GYNECOLOGICAL EXAMINATION WITHOUT ABNORMAL FINDING: Primary | ICD-10-CM

## 2024-01-08 PROCEDURE — G0101 CA SCREEN;PELVIC/BREAST EXAM: HCPCS | Performed by: OBSTETRICS & GYNECOLOGY

## 2024-01-08 NOTE — PROGRESS NOTES
[facility] - [address], [address], [place] [address]    ASSESSMENT/PLAN: Julia Valdez is a 73 y.o.  who presents for annual gynecologic exam.    Encounter for routine gynecologic examination  - Routine well woman exam completed today.  - Cervical Cancer Screening: Current ASCCP Guidelines reviewed. Last Pap: 2021 . Next Pap Due: none ho  hx of abn    - COVID vaccine  x 3   - Breast Cancer Screenin g: Last Mammogram 2021,  Pt states done   2023   - Colorectal cancer screening was not ordered.  - The following were reviewed in today's visit: breast self exam, mammography screening ordered, menopause, osteoporosis, adequate intake of calcium and vitamin D, exercise, healthy diet, and DEXA ordered    Additional problems addressed during this visit:  1. Encounter for gynecological examination without abnormal finding    2. Estrogen deficiency  -     DXA bone density spine hip and pelvis; Future    3. Postmenopausal    4. Encounter for screening mammogram for malignant neoplasm of breast  -     Mammo screening bilateral w 3d & cad; Future    5. Screening for osteoporosis  -     DXA bone density spine hip and pelvis; Future    6. BMI 32.0-32.9,adult      CC:  Annual Gynecologic Examination    HPI: Julia Valdez is a 73 y.o.  who presents for annual gynecologic examination.  72 yo here for wellness exam.    No bleeding, bloating  or satiety.    Bowels and  bladder wnl  .  No hx of   fx  of  bone since  menopause .   No hx of hip        The following portions of the patient's history were reviewed and updated as appropriate: She  has a past medical history of Breast cancer screening (2021), H/O bone density study (2021), Lyme disease, Obesity due to excess calories, Papanicolaou smear (), and Varicella ().  She  has a past surgical history that includes  section; Tubal ligation (); Mammo (historical) (2021); DXA  "procedure(historical) (12/23/2021); Colonoscopy (05/01/2017); and pr removal foreign body foot complicated (Left, 11/17/2023).  Her family history includes Brain cancer in her family; Diabetes in her mother; Heart attack in her mother; Osteoarthritis in her mother; Throat cancer in her father.  She  reports that she has never smoked. She has never been exposed to tobacco smoke. She has never used smokeless tobacco. She reports current alcohol use. She reports that she does not use drugs.  No current outpatient medications on file.     No current facility-administered medications for this visit.     She is allergic to sulfamethoxazole-trimethoprim..    Review of Systems:  All systems normal except as noted in HPI          Objective:  /74 (BP Location: Left arm, Patient Position: Sitting, Cuff Size: Standard)   Ht 5' 3\" (1.6 m)   Wt 82.1 kg (181 lb)   BMI 32.06 kg/m²    Physical Exam  Vitals and nursing note reviewed.   Constitutional:       Appearance: Normal appearance.   HENT:      Head: Normocephalic.   Cardiovascular:      Rate and Rhythm: Normal rate and regular rhythm.      Pulses: Normal pulses.   Pulmonary:      Effort: Pulmonary effort is normal.      Breath sounds: Normal breath sounds.   Chest:      Chest wall: No mass, lacerations, swelling, tenderness or edema.   Breasts:     Dragan Score is 4.      Breasts are symmetrical.      Right: Normal. No swelling, bleeding, inverted nipple, mass, nipple discharge, skin change or tenderness.      Left: No swelling, bleeding, inverted nipple, mass, nipple discharge, skin change or tenderness.   Abdominal:      General: Abdomen is flat. Bowel sounds are normal.      Palpations: Abdomen is soft.   Genitourinary:     General: Normal vulva.      Exam position: Lithotomy position.      Pubic Area: No rash.       Dragan stage (genital): 4.      Labia:         Right: No rash, tenderness or lesion.         Left: No rash, tenderness or lesion.       Urethra: No " urethral pain, urethral swelling or urethral lesion.      Vagina: Normal.      Cervix: No cervical motion tenderness or discharge.      Uterus: Normal.       Adnexa: Right adnexa normal and left adnexa normal.      Rectum: Normal.   Musculoskeletal:         General: Normal range of motion.      Cervical back: Neck supple.   Lymphadenopathy:      Upper Body:      Right upper body: No supraclavicular, axillary or pectoral adenopathy.      Left upper body: No supraclavicular, axillary or pectoral adenopathy.      Lower Body: No right inguinal adenopathy. No left inguinal adenopathy.   Skin:     General: Skin is warm and dry.   Neurological:      General: No focal deficit present.      Mental Status: She is alert and oriented to person, place, and time.   Psychiatric:         Mood and Affect: Mood normal.         Behavior: Behavior normal.         Thought Content: Thought content normal.         Judgment: Judgment normal.

## 2024-01-08 NOTE — PATIENT INSTRUCTIONS
Calcium 1200-1500mg + 600-1000 IU Vit D daily. Exercise 150 minutes per week minimum including weight bearing exercises. Pap with high risk HPV Q 3-5 years.  Annual mammogram and monthly breast self exam recommended.  Colonoscopy-  encouraged      . Kegels 20 times twice daily. Silicone based lubricant with sex. Vaginal moisturizers twice weekly as needed.

## 2024-02-21 PROBLEM — Z01.419 ENCOUNTER FOR GYNECOLOGICAL EXAMINATION WITHOUT ABNORMAL FINDING: Status: RESOLVED | Noted: 2024-01-08 | Resolved: 2024-02-21

## 2025-01-02 ENCOUNTER — RESULTS FOLLOW-UP (OUTPATIENT)
Dept: OBGYN CLINIC | Facility: CLINIC | Age: 75
End: 2025-01-02

## 2025-01-02 DIAGNOSIS — E28.39 ESTROGEN DEFICIENCY: ICD-10-CM

## 2025-01-02 DIAGNOSIS — Z13.820 SCREENING FOR OSTEOPOROSIS: ICD-10-CM

## 2025-01-13 ENCOUNTER — RESULTS FOLLOW-UP (OUTPATIENT)
Dept: OBGYN CLINIC | Facility: CLINIC | Age: 75
End: 2025-01-13

## (undated) DEVICE — STRETCH BANDAGE: Brand: CURITY

## (undated) DEVICE — GLOVE INDICATOR PI UNDERGLOVE SZ 6.5 BLUE

## (undated) DEVICE — DRAPE C-ARM X-RAY

## (undated) DEVICE — GLOVE PI ULTRA TOUCH SZ.7.5

## (undated) DEVICE — STANDARD SURGICAL GOWN, L: Brand: CONVERTORS

## (undated) DEVICE — GLOVE INDICATOR PI UNDERGLOVE SZ 7 BLUE

## (undated) DEVICE — CHLORAPREP HI-LITE 26ML ORANGE

## (undated) DEVICE — SYRINGE 10ML LL

## (undated) DEVICE — 3M™ STERI-STRIP™ REINFORCED ADHESIVE SKIN CLOSURES, R1547, 1/2 IN X 4 IN (12 MM X 100 MM), 6 STRIPS/ENVELOPE: Brand: 3M™ STERI-STRIP™

## (undated) DEVICE — STERILE POLYISOPRENE POWDER-FREE SURGICAL GLOVES: Brand: PROTEXIS

## (undated) DEVICE — NEEDLE 18 G X 1 1/2

## (undated) DEVICE — CUFF TOURNIQUET 18 X 4 IN QUICK CONNECT DISP 1 BLADDER

## (undated) DEVICE — CURITY NON-ADHERENT STRIPS: Brand: CURITY

## (undated) DEVICE — COBAN 4 IN STERILE

## (undated) DEVICE — BETHLEHEM UNIVERSAL  MIONR EXT: Brand: CARDINAL HEALTH

## (undated) DEVICE — GAUZE SPONGES,16 PLY: Brand: CURITY

## (undated) DEVICE — DISPOSABLE OR TOWEL: Brand: CARDINAL HEALTH

## (undated) DEVICE — INTENDED FOR TISSUE SEPARATION, AND OTHER PROCEDURES THAT REQUIRE A SHARP SURGICAL BLADE TO PUNCTURE OR CUT.: Brand: BARD-PARKER ® SAFETYLOCK CARBON RIB-BACK BLADES

## (undated) DEVICE — NEEDLE 25G X 1 1/2

## (undated) DEVICE — POV-IOD SOLUTION 4OZ BT

## (undated) DEVICE — STOCKINETTE 2P PREROLLD 6X60